# Patient Record
Sex: MALE | Race: WHITE | NOT HISPANIC OR LATINO | URBAN - METROPOLITAN AREA
[De-identification: names, ages, dates, MRNs, and addresses within clinical notes are randomized per-mention and may not be internally consistent; named-entity substitution may affect disease eponyms.]

---

## 2021-03-02 ENCOUNTER — EMERGENCY (EMERGENCY)
Facility: HOSPITAL | Age: 21
LOS: 1 days | Discharge: HOME | End: 2021-03-02
Attending: EMERGENCY MEDICINE | Admitting: EMERGENCY MEDICINE
Payer: COMMERCIAL

## 2021-03-02 VITALS
RESPIRATION RATE: 16 BRPM | WEIGHT: 209 LBS | DIASTOLIC BLOOD PRESSURE: 75 MMHG | HEIGHT: 74 IN | TEMPERATURE: 99 F | SYSTOLIC BLOOD PRESSURE: 150 MMHG | OXYGEN SATURATION: 98 % | HEART RATE: 67 BPM

## 2021-03-02 VITALS
OXYGEN SATURATION: 100 % | SYSTOLIC BLOOD PRESSURE: 131 MMHG | RESPIRATION RATE: 20 BRPM | HEART RATE: 73 BPM | DIASTOLIC BLOOD PRESSURE: 73 MMHG

## 2021-03-02 DIAGNOSIS — M79.652 PAIN IN LEFT THIGH: ICD-10-CM

## 2021-03-02 PROCEDURE — 93971 EXTREMITY STUDY: CPT | Mod: 26,LT

## 2021-03-02 PROCEDURE — 99284 EMERGENCY DEPT VISIT MOD MDM: CPT

## 2021-03-02 RX ORDER — IBUPROFEN 200 MG
600 TABLET ORAL ONCE
Refills: 0 | Status: COMPLETED | OUTPATIENT
Start: 2021-03-02 | End: 2021-03-02

## 2021-03-02 RX ADMIN — Medication 600 MILLIGRAM(S): at 21:10

## 2021-03-02 NOTE — ED PROVIDER NOTE - NSFOLLOWUPINSTRUCTIONS_ED_ALL_ED_FT
Follow up with your primary care doctor  Alternate tylenol/motrin if you do not have any allergies/intolerance   You can take 600mg of motrin every 6 hours with food as needed  You can take 1000mg of tylenol every 6 hours as needed   Return immediately for any new or worsening symptoms or any new concerns

## 2021-03-02 NOTE — ED PROVIDER NOTE - CLINICAL SUMMARY MEDICAL DECISION MAKING FREE TEXT BOX
left anterior thigh pain rad to l upper calf w/o trauma, hx of venous malformation, no acute arterial ischemia on exam, will check US r/o DVT

## 2021-03-02 NOTE — ED ADULT TRIAGE NOTE - CHIEF COMPLAINT QUOTE
Pt w/ PMH of venous malformation to left thigh presents c/o pain to left calf x24 hours.  Pt denies trauma or injury.  Pt does not take AC.  Pt denies redness, swelling or cough/SOB.

## 2021-03-02 NOTE — ED PROVIDER NOTE - PHYSICAL EXAMINATION
Physical Exam  GEN: Awake, alert, non-toxic appearing, NCAT  EYES: full EOMI,  ENT: External inspection normal, normal voice,   HEAD: atraumatic  NECK: FROM neck, supple,   RESP: no tachypnea, no hypoxia, no resp distress,  MSK: no calf/thigh swelling, +tenderness to L anterior lower thigh,   SKIN: cap refill < 2 sec, pedal pulse palpable, no peripheral cyanosis, no erythema

## 2021-03-02 NOTE — ED ADULT TRIAGE NOTE - INTERNATIONAL TRAVEL
Telephone Encounter by Clarita Freeman MD at 05/21/18 10:04 AM     Author:  Clarita Freeman MD Service:  (none) Author Type:  Physician     Filed:  05/21/18 10:04 AM Encounter Date:  5/21/2018 Status:  Signed     :  Clarita Freeman MD (Physician)            Please send my note to PCP.[BM1.1M]      Revision History        User Key Date/Time User Provider Type Action    > BM1.1 05/21/18 10:04 AM Clarita Freeman MD Physician Sign    M - Manual            
Telephone Encounter by Diana So RN at 05/22/18 02:13 PM     Author:  Diana oS RN Service:  (none) Author Type:  Registered Nurse     Filed:  05/22/18 02:15 PM Encounter Date:  5/21/2018 Status:  Signed     :  Diana So RN (Registered Nurse)            5- office notes faxed to Dr Felipe Hoover [phone] as requested.[BN1.1M]      Revision History        User Key Date/Time User Provider Type Action    > BN1.1 05/22/18 02:15 PM Diana So RN Registered Nurse Sign    M - Manual            
No

## 2021-03-02 NOTE — ED ADULT NURSE NOTE - OBJECTIVE STATEMENT
Pt states "I have VM in my left leg and last night when I was trying to step on my scale I had this sharp cramping pain in calf and my doctor said I should come in to make sure its not a clot".

## 2021-03-02 NOTE — ED PROVIDER NOTE - PATIENT PORTAL LINK FT
You can access the FollowMyHealth Patient Portal offered by [facility] by registering at the following website: [email or web address]. By joining [employer]’s FollowMyHealth portal, you will also be able to view your health information using other applications (apps) compatible with our system.

## 2021-03-04 ENCOUNTER — APPOINTMENT (OUTPATIENT)
Dept: HEART AND VASCULAR | Facility: CLINIC | Age: 21
End: 2021-03-04
Payer: COMMERCIAL

## 2021-03-04 DIAGNOSIS — Z78.9 OTHER SPECIFIED HEALTH STATUS: ICD-10-CM

## 2021-03-04 PROBLEM — Z00.00 ENCOUNTER FOR PREVENTIVE HEALTH EXAMINATION: Status: ACTIVE | Noted: 2021-03-04

## 2021-03-04 PROCEDURE — 99204 OFFICE O/P NEW MOD 45 MIN: CPT

## 2021-03-04 PROCEDURE — 99072 ADDL SUPL MATRL&STAF TM PHE: CPT

## 2021-03-04 RX ORDER — CHROMIUM 200 MCG
1000 TABLET ORAL
Refills: 0 | Status: ACTIVE | COMMUNITY

## 2021-03-04 NOTE — PHYSICAL EXAM
[Alert] : alert [PERRL] : pupils equal, round and reactive to light [Normal Hearing] : hearing was normal [Supple] : the neck was supple [No Respiratory Distress] : no respiratory distress [Pedal Pulses Normal] : the pedal pulses are present [No Edema] : there was no peripheral edema [Not Tender] : non-tender [No CVA Tenderness] : no ~M costovertebral angle tenderness [Oriented x3] : oriented to person, place, and time
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
## 2021-03-12 PROBLEM — Q27.9 CONGENITAL MALFORMATION OF PERIPHERAL VASCULAR SYSTEM, UNSPECIFIED: Chronic | Status: ACTIVE | Noted: 2021-03-02

## 2021-03-17 ENCOUNTER — RESULT REVIEW (OUTPATIENT)
Age: 21
End: 2021-03-17

## 2021-03-17 ENCOUNTER — APPOINTMENT (OUTPATIENT)
Dept: MRI IMAGING | Facility: CLINIC | Age: 21
End: 2021-03-17
Payer: COMMERCIAL

## 2021-03-17 ENCOUNTER — OUTPATIENT (OUTPATIENT)
Dept: OUTPATIENT SERVICES | Facility: HOSPITAL | Age: 21
LOS: 1 days | End: 2021-03-17

## 2021-03-17 PROCEDURE — 73720 MRI LWR EXTREMITY W/O&W/DYE: CPT | Mod: 26,LT

## 2021-03-23 ENCOUNTER — APPOINTMENT (OUTPATIENT)
Dept: MRI IMAGING | Facility: CLINIC | Age: 21
End: 2021-03-23

## 2021-10-04 ENCOUNTER — APPOINTMENT (OUTPATIENT)
Dept: HEART AND VASCULAR | Facility: CLINIC | Age: 21
End: 2021-10-04
Payer: COMMERCIAL

## 2021-10-04 DIAGNOSIS — M79.89 OTHER SPECIFIED SOFT TISSUE DISORDERS: ICD-10-CM

## 2021-10-04 DIAGNOSIS — Z78.9 OTHER SPECIFIED HEALTH STATUS: ICD-10-CM

## 2021-10-04 DIAGNOSIS — Q27.9 CONGENITAL MALFORMATION OF PERIPHERAL VASCULAR SYSTEM, UNSPECIFIED: ICD-10-CM

## 2021-10-04 DIAGNOSIS — M25.662 STIFFNESS OF LEFT KNEE, NOT ELSEWHERE CLASSIFIED: ICD-10-CM

## 2021-10-04 PROCEDURE — 99214 OFFICE O/P EST MOD 30 MIN: CPT

## 2021-10-08 PROBLEM — Q27.9 VENOUS MALFORMATION: Status: ACTIVE | Noted: 2021-03-04

## 2021-10-08 PROBLEM — Z78.9 NO PERTINENT PAST MEDICAL HISTORY: Status: RESOLVED | Noted: 2021-10-08 | Resolved: 2021-10-08

## 2021-10-08 PROBLEM — M25.662 DECREASED ROM OF LEFT KNEE: Status: ACTIVE | Noted: 2021-10-08

## 2021-10-08 PROBLEM — M79.89 SWELLING OF THIGH: Status: ACTIVE | Noted: 2021-10-08

## 2021-10-08 NOTE — PHYSICAL EXAM
[Alert] : alert [No Acute Distress] : no acute distress [PERRL] : pupils equal, round and reactive to light [Normal Hearing] : hearing was normal [No Neck Mass] : no neck mass was observed [No Respiratory Distress] : no respiratory distress [Normal Rate] : heart rate was normal  [Regular Rhythm] : with a regular rhythm [Femoral Arteries Normal] : femoral pulses were normal without bruits [Normal Bowel Sounds] : normal bowel sounds [Not Tender] : non-tender [Not Distended] : not distended [Normal Gait] : normal gait [Normal Strength/Tone] : muscle strength and tone were normal [No Rash] : no rash [No Skin Lesions] : no skin lesions [No Motor Deficits] : the motor exam was normal [No Sensory Deficits] : the sensory exam was normal to light touch and pinprick [Oriented x3] : oriented to person, place, and time [de-identified] : left thigh slightly larger than right thigh

## 2021-10-08 NOTE — ASSESSMENT
[FreeTextEntry1] : This is a 20-year-old male otherwise in good health who has a long history of an intramuscular venous malformation in the distal left thigh which was treated multiple times with ethanol by Dr. Aguilar. In general he has done fairly well aside from one episode of ulceration which healed. Over the last several years he has had issues with pain around the knee and inability to completely flex the knee joint. It does not seriously interfere with his lifestyle or activity but it is bothersome. He also had a recent episode of pain in the upper left calf and we got a new MRI to evaluate the calf for any other venous malformation but none was present. It did show some intramuscular edema consistent with a muscle strain or neurogenic edema. That pain has since resolved. The MRI of the treated thigh venous malformation shows a small amount of residual vascularity and a defect in the muscle tissues. We had a discussion about the nature of his current symptoms and I told them it was most likely secondary to the embolization, as there does not appear to be that much in the way of residual malformation. I suggested that we try a course of physical therapy to see if we can improve his flexibility and reduce the discomfort.

## 2021-11-14 ENCOUNTER — EMERGENCY (EMERGENCY)
Facility: HOSPITAL | Age: 21
LOS: 1 days | Discharge: HOME | End: 2021-11-14
Attending: EMERGENCY MEDICINE | Admitting: EMERGENCY MEDICINE
Payer: COMMERCIAL

## 2021-11-14 VITALS
HEIGHT: 74 IN | OXYGEN SATURATION: 97 % | RESPIRATION RATE: 18 BRPM | WEIGHT: 225.09 LBS | DIASTOLIC BLOOD PRESSURE: 81 MMHG | SYSTOLIC BLOOD PRESSURE: 146 MMHG | TEMPERATURE: 98 F | HEART RATE: 80 BPM

## 2021-11-14 DIAGNOSIS — M25.562 PAIN IN LEFT KNEE: ICD-10-CM
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
## 2021-11-14 PROCEDURE — 99284 EMERGENCY DEPT VISIT MOD MDM: CPT | Mod: 25

## 2021-11-14 PROCEDURE — 73562 X-RAY EXAM OF KNEE 3: CPT | Mod: 26,LT

## 2021-11-14 NOTE — ED PROVIDER NOTE - CARE PROVIDER_API CALL
Derek Ruffin)  Orthopaedic Surgery  [address] [place] [address]  Phone: [phone]  Fax: [phone]  Follow Up Time:     Quentin Bingham)  [facility]  Orthopedics  [address] [place] [address]  Phone: [phone]  Fax: [phone]  Follow Up Time:

## 2021-11-14 NOTE — ED PROVIDER NOTE - PATIENT PORTAL LINK FT
You can access the FollowMyHealth Patient Portal offered by [facility] by registering at the following website: [email or web address]. By joining [employer]’s FollowMyHealth portal, you will also be able to view your health information using other applications (apps) compatible with our system.

## 2021-11-14 NOTE — ED ADULT TRIAGE NOTE - CHIEF COMPLAINT QUOTE
Pt walks in c/o L knee pain after a mechanical slip and fall this morning. Pt unable to bear full weight on L knee. PMH of L thigh AVM.

## 2021-11-14 NOTE — ED PROVIDER NOTE - NSFOLLOWUPINSTRUCTIONS_ED_ALL_ED_FT
Please follow up with the orthopedist this week.  Use the crutches and knee immobilizer when walking and please avoid bearing weight on the knee.  Ibuprofen or Tylenol as needed for pain.

## 2021-11-14 NOTE — ED BEHAVIORAL HEALTH NOTE - BEHAVIORAL HEALTH NOTE
SW was consulted to the ED to discuss follow up care with Orthopedic Provider.  SW offered PT a list of first available appt with Orthopedic Providers but could not decide which appt to schedule today.  SW informed PT will receive a follow up call tomorrow from a CM and gave PT contact information when PT is ready to schedule a follow up appt.  Team made aware and LIZZIE made available for further assistance.

## 2021-11-14 NOTE — ED ADULT NURSE NOTE - NSIMPLEMENTINTERV_GEN_ALL_ED
Implemented All Fall Risk Interventions:  [place] to call system. Call bell, personal items and telephone within reach. Instruct patient to call for assistance. Room bathroom lighting operational. Non-slip footwear when patient is off stretcher. Physically safe environment: no spills, clutter or unnecessary equipment. Stretcher in lowest position, wheels locked, appropriate side rails in place. Provide visual cue, wrist band, yellow gown, etc. Monitor gait and stability. Monitor for mental status changes and reorient to person, place, and time. Review medications for side effects contributing to fall risk. Reinforce activity limits and safety measures with patient and family.

## 2021-11-14 NOTE — ED ADULT NURSE NOTE - OBJECTIVE STATEMENT
Pt reports slip and fall, landing on left knee, endorses hx of 10-11 LLE surgeries. Admits to pain w/ activity, denies pain at rest. No obvious swelling or deformities noted. Positive distal pulses, pt denies numbness or tingling.

## 2021-11-14 NOTE — ED PROVIDER NOTE - CLINICAL SUMMARY MEDICAL DECISION MAKING FREE TEXT BOX
20 y/o M with PMHx of L side venous malformation presents to ED complaining of knee pain secondary to a mechanical fall. Pt is in no acute distress but has difficulty bearing weight. Will obtain X-ray and have pt follow up with orthopedics.

## 2021-11-14 NOTE — ED PROVIDER NOTE - OBJECTIVE STATEMENT
20 y/o M with PMHx of L side venous malformation presents to ED complaining of knee pain secondary to a mechanical slip on a plastic lid last night. Pt is unable to fully wear weight.

## 2021-11-14 NOTE — ED PROVIDER NOTE - CARE PROVIDERS DIRECT ADDRESSES
[email or web address]

## 2022-07-18 ENCOUNTER — APPOINTMENT (OUTPATIENT)
Dept: HEART AND VASCULAR | Facility: CLINIC | Age: 22
End: 2022-07-18

## 2022-08-15 ENCOUNTER — APPOINTMENT (OUTPATIENT)
Dept: HEART AND VASCULAR | Facility: CLINIC | Age: 22
End: 2022-08-15

## 2022-08-15 ENCOUNTER — NON-APPOINTMENT (OUTPATIENT)
Age: 22
End: 2022-08-15

## 2022-08-15 PROCEDURE — 99213 OFFICE O/P EST LOW 20 MIN: CPT

## 2022-08-17 NOTE — HISTORY OF PRESENT ILLNESS
[FreeTextEntry1] : Mr. 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 He has undergone several procedures with Dr. Aguilar in [place] and notes that he is scheduled to see him next week as he is going back to [place]. He reports increasing pain in his L knee that started around 2-3 weeks ago and notes it is restricting his range of motion.

## 2022-08-17 NOTE — PHYSICAL EXAM
[Alert] : alert [No Acute Distress] : no acute distress [No Neck Mass] : no neck mass was observed [No Respiratory Distress] : no respiratory distress [Normal Bowel Sounds] : normal bowel sounds [Not Tender] : non-tender [Normal Gait] : normal gait [de-identified] : Normal ROM of L knee

## 2022-08-17 NOTE — ASSESSMENT
[FreeTextEntry1] : This is a 20-year-old male that is status post multiple direct embolizations of a distal left thigh intramuscular venous malformation. These procedures were performed by Dr Aguilar in [place], where Shantelle is from originally. He now lives in [place]. I saw him in consultation last year for residual symptoms of intermittent pain and stiffness in the thigh sometimes preventing him from the straightening his leg completely. The MRI scans last year showed a defect in the muscle presumably related to the prior ethanol embolization treatments with some T2 bright areas which may represent residual malformation. I recommended physical therapy last year and he did that with some improvement but he said over the last few months he is beginning to get worse again, especially when he is sitting for any prolonged period of time. He scheduled to have a scan next week in [place] and then see Dr. Aguilar. I suggested that we wait for the scan results which they will send me, as well as Dr Aguilar opinion regarding further treatment.

## 2022-09-18 ENCOUNTER — EMERGENCY (EMERGENCY)
Facility: HOSPITAL | Age: 22
LOS: 1 days | Discharge: HOME | End: 2022-09-18
Admitting: EMERGENCY MEDICINE

## 2022-09-18 VITALS
DIASTOLIC BLOOD PRESSURE: 81 MMHG | WEIGHT: 259.93 LBS | OXYGEN SATURATION: 98 % | HEIGHT: 74 IN | RESPIRATION RATE: 19 BRPM | TEMPERATURE: 98 F | HEART RATE: 58 BPM | SYSTOLIC BLOOD PRESSURE: 133 MMHG

## 2022-09-18 PROCEDURE — 99283 EMERGENCY DEPT VISIT LOW MDM: CPT

## 2022-09-18 NOTE — ED ADULT NURSE NOTE - CHIEF COMPLAINT QUOTE
Pt presents to ed reporting laceration to right middle finger s/p cut with knife. bleeding controlled with pressure. unknown last tdap

## 2022-09-19 NOTE — ED PROVIDER NOTE - OBJECTIVE STATEMENT
Patient presents with laceration to the R3rd finger today. denies paresthesias or focal weakness. last tetanus <5 years ago.

## 2022-09-19 NOTE — ED PROVIDER NOTE - CLINICAL SUMMARY MEDICAL DECISION MAKING FREE TEXT BOX
Patient present with laceration to the R 3rd finger this evening. tetanus up to date. wound sutured without complication. advised return in 10 days for suture removal. all precautions reviewed

## 2022-09-19 NOTE — ED PROVIDER NOTE - NSFOLLOWUPINSTRUCTIONS_ED_ALL_ED_FT
apply bacitracin to wound twice daily.  return in 10 days for suture removal.   return immediately if fever, chills, increased pain, redness, swelling.

## 2022-09-19 NOTE — ED PROVIDER NOTE - PATIENT PORTAL LINK FT
You can access the FollowMyHealth Patient Portal offered by [facility] by registering at the following website: [email or web address]. By joining [employer]’s FollowMyHealth portal, you will also be able to view your health information using other applications (apps) compatible with our system.

## 2022-09-21 DIAGNOSIS — S61.212A LACERATION WITHOUT FOREIGN BODY OF RIGHT MIDDLE FINGER WITHOUT DAMAGE TO NAIL, INITIAL ENCOUNTER: ICD-10-CM
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
## 2023-04-21 NOTE — ED PROVIDER NOTE - NS_EDPROVIDERDISPOUSERTYPE_ED_A_ED
I have personally evaluated and examined the patient. The Attending was available to me as a supervising provider if needed. CGI<=3  symptom stabilization  outpatient follow up  shelter

## 2024-01-30 ENCOUNTER — APPOINTMENT (OUTPATIENT)
Dept: HEPATOLOGY | Facility: CLINIC | Age: 24
End: 2024-01-30
Payer: COMMERCIAL

## 2024-01-30 VITALS
WEIGHT: 275 LBS | HEIGHT: 73 IN | DIASTOLIC BLOOD PRESSURE: 78 MMHG | TEMPERATURE: 98.5 F | RESPIRATION RATE: 16 BRPM | BODY MASS INDEX: 36.45 KG/M2 | OXYGEN SATURATION: 97 % | HEART RATE: 84 BPM | SYSTOLIC BLOOD PRESSURE: 134 MMHG

## 2024-01-30 PROCEDURE — 76981 USE PARENCHYMA: CPT

## 2024-01-30 PROCEDURE — 99204 OFFICE O/P NEW MOD 45 MIN: CPT

## 2024-02-02 NOTE — PHYSICAL EXAM
[Scleral Icterus] : No Scleral Icterus [Abdominal  Ascites] : no ascites [Asterixis] : no asterixis observed [Jaundice] : No jaundice [Depression] : no depression [Hallucinations] : ~T no ~M hallucinations [General Appearance - Alert] : alert [General Appearance - In No Acute Distress] : in no acute distress [General Appearance - Well Nourished] : well nourished [General Appearance - Well Developed] : well developed [General Appearance - Well-Appearing] : healthy appearing [Sclera] : the sclera and conjunctiva were normal [Extraocular Movements] : extraocular movements were intact [] : no respiratory distress [Exaggerated Use Of Accessory Muscles For Inspiration] : no accessory muscle use [Auscultation Breath Sounds / Voice Sounds] : lungs were clear to auscultation bilaterally [Heart Rate And Rhythm] : heart rate was normal and rhythm regular [Edema] : there was no peripheral edema [Bowel Sounds] : normal bowel sounds [Abdomen Soft] : soft [Abdomen Tenderness] : non-tender [Abdomen Mass (___ Cm)] : no abdominal mass palpated [Skin Color & Pigmentation] : normal skin color and pigmentation [No Focal Deficits] : no focal deficits [Oriented To Time, Place, And Person] : oriented to person, place, and time [Impaired Insight] : insight and judgment were intact [Affect] : the affect was normal

## 2024-02-02 NOTE — HISTORY OF PRESENT ILLNESS
[FreeTextEntry1] : Pt is a 23 year old male with PMH class II obesity (BMI 36), vascular malformation of L knee s/p multiple direct embolizations of a distal L thigh intramuscular venous malformation who presents for initial visit for evaluation/management of elevated LFTs. PCP: Jules Rayo GI: None   The pt brought the following records in for review at today's visit:  No prior LFTs, abd imaging in EMR.  -7/2019: WBC 11.41, Hgb 15.8, Plt 300. Na 140, K 4.7, Cr 1.14. AST/ALT 46/123, TB 0.7,  (wnl; nl range ), Alb 4.1, TP 7.8. INR 1.0.  -12/22/23: WBC 7.6, Hgb 15.2, plt 318, Cr 0.92. AST/ALT 26/53, TB 0.9, , Alb 5, TP 7.4. GGT 27. TSH 5.11. VIt D 24, Cholesterol 181, HDL 46, Triglyceride 76, , A1c 5   Liver Hx: Diagnosed at age: [age] pt had routine labs which showed elevated labs. ~2021 labs LFTs WNL. August 2023 while in [place], LFTs elevated again on a routine lab. Sonogram was order but pt never got it done because he moved to [facility]. While in [place], pt saw PCP 12/2023 and labs done which showed elevated LFTs. Pt reports his weight fluctuates and lowest was 210lb at summer 2020. During January 2020, he started to diet/exercise and stopped drinking alcohol and he lost 30lbs. Pt got labs done after that and his LFTs were WNL at that time.   Pt reports due to poor diet and lack of exercise, he gained 65lbs since summer 2020. Today pt reports feeling well. Denies fever, chills, n/v/d/c, SOB, CP, cough, abd pain, blood in stool or black stool. Good appetite. Reports doing lots of strength training recently.  Prior liver biopsy: Never Prior HE: never Prior GI bleed: never   Family Hx: Maternal grandmother with thyroid issue. Mother with Hashimoto. Denies hepatitis, fatty liver, cirrhosis, liver cancer, crohn's disease, colon cancer, Lupus. Grandmother's sibling with leukemia and one with brain cancer.   Social Hx Tobacco: never Alcohol: ~6 drinks (wine or cocktail) a week for 8 months. He would not drink for a week and another week with 10 drinks. Usually drinks with friends and gf. Did not drink during covid19. Drugs: not now. Used to smoke Marijuana heavily while in [place].  Worked at an  company previously. Graduated from [employer]. Now unemployed. Lives with alone   PSH: left thigh venous malformation: 12-13 pure alcohol embolization since he was 10 years.  Last EGD: never Last COL: never  Allergies: NKDA   CURRENT MEDS: Shanice D supplement.  No herbal supplements.

## 2024-02-02 NOTE — ASSESSMENT
[FreeTextEntry1] : Pt is a 23 year old male with PMH class II obesity (BMI 36), vascular malformation of L knee s/p multiple direct embolizations of a distal L thigh intramuscular venous malformation who presents for initial visit for evaluation/management of elevated LFTs.  #Elevated LFTs:  Likely 2/2 MASLD in setting of obesity (with rapid increases/decreases in weight over time). However, given pt's young age, will also check serologies for other etiologies of chronic liver disease. A1c wnl in 12/2023; lipid panel 12/2023 with elevated LDL (118). - Check chronic liver disease serologies, hepatitis A/B/C serologies. - Check Abd US/doppler. - Fibroscan was performed today:  (S3), 6.5 kPa (F0-1). Results were discussed with the pt. - Counseled pt on natural history of MASLD - Counseled pt on dietary/exercise lifestyle modifications for weight loss. Discussed Mediterranean diet. Goal weight loss 7-10% of current weight. - Fibroscan annually (next in 1/2025).  RTC in 6 weeks.

## 2024-02-02 NOTE — REVIEW OF SYSTEMS
[Fever] : no fever [Chills] : no chills [Feeling Poorly] : not feeling poorly [Feeling Tired] : not feeling tired [Sore Throat] : no sore throat [Hoarseness] : no hoarseness [Chest Pain] : no chest pain [Palpitations] : no palpitations [Leg Claudication] : no intermittent leg claudication [Lower Ext Edema] : no extremity edema [Shortness Of Breath] : no shortness of breath [Wheezing] : no wheezing [Cough] : no cough [SOB on Exertion] : no shortness of breath during exertion [Abdominal Pain] : no abdominal pain [Vomiting] : no vomiting [Constipation] : no constipation [Diarrhea] : no diarrhea [Melena] : no melena

## 2024-02-21 ENCOUNTER — OUTPATIENT (OUTPATIENT)
Dept: OUTPATIENT SERVICES | Facility: HOSPITAL | Age: 24
LOS: 1 days | End: 2024-02-21

## 2024-02-21 ENCOUNTER — APPOINTMENT (OUTPATIENT)
Dept: ULTRASOUND IMAGING | Facility: CLINIC | Age: 24
End: 2024-02-21
Payer: COMMERCIAL

## 2024-02-21 ENCOUNTER — RESULT REVIEW (OUTPATIENT)
Age: 24
End: 2024-02-21

## 2024-02-21 PROCEDURE — 93975 VASCULAR STUDY: CPT | Mod: 26

## 2024-02-21 PROCEDURE — 76700 US EXAM ABDOM COMPLETE: CPT | Mod: 26,59

## 2024-02-22 ENCOUNTER — NON-APPOINTMENT (OUTPATIENT)
Age: 24
End: 2024-02-22

## 2024-02-28 ENCOUNTER — LABORATORY RESULT (OUTPATIENT)
Age: 24
End: 2024-02-28

## 2024-03-12 ENCOUNTER — APPOINTMENT (OUTPATIENT)
Dept: HEPATOLOGY | Facility: CLINIC | Age: 24
End: 2024-03-12
Payer: COMMERCIAL

## 2024-03-12 VITALS
HEART RATE: 78 BPM | OXYGEN SATURATION: 96 % | HEIGHT: 73 IN | BODY MASS INDEX: 35.12 KG/M2 | DIASTOLIC BLOOD PRESSURE: 79 MMHG | RESPIRATION RATE: 16 BRPM | WEIGHT: 265 LBS | TEMPERATURE: 97.3 F | SYSTOLIC BLOOD PRESSURE: 138 MMHG

## 2024-03-12 DIAGNOSIS — K76.0 FATTY (CHANGE OF) LIVER, NOT ELSEWHERE CLASSIFIED: ICD-10-CM

## 2024-03-12 DIAGNOSIS — R74.8 ABNORMAL LEVELS OF OTHER SERUM ENZYMES: ICD-10-CM

## 2024-03-12 PROCEDURE — 99214 OFFICE O/P EST MOD 30 MIN: CPT

## 2024-03-12 NOTE — ASSESSMENT
[FreeTextEntry1] : Pt is a 23 year old male with PMH class II obesity (BMI 36), vascular malformation of L knee s/p multiple direct embolizations of a distal L thigh intramuscular venous malformation who presents for follow-up visit for evaluation/management of elevated LFTs.  -12/22/23: WBC 7.6, Hgb 15.2, plt 318, Cr 0.92. AST/ALT 26/53, TB 0.9, , Alb 5, TP 7.4. GGT 27. TSH 5.11. VIt D 24, Cholesterol 181, HDL 46, Triglyceride 76, , A1c 5.0%. -1/30/24: Seen for initial visit. [Weight 275#, BMI 36.28] Fibroscan =>  (S3), 6.5 kPa (F1), IQR 19%. -2/2024: Abd US/doppler => Normal liver size. Diffusely increased parenchymal echogenicity c/w hepatic steatosis. Smooth liver contour. No focal liver lesions. No biliary ductal dilatation. GB wnl. Spleen 13.1cm borderline enlarged. No ascites. Patent hepatic and portal veins. -2/28/24: WBC 7.33, Hgb 15.2, Plt 279. Na 137, K 5.2, Cr 1.14. Ca 10.6. AST/ALT 23/42, TB 0.4, , Alb 5.1, TP 7.4. INR 1.03. Total cholesterol 162, HDL 40 (low),  (high), TG 58. NOÉ neg, ASMA neg, AMA neg, IgG quant wnl, IgM quant wnl. Serum Fe 63, Iron sat 18%, ferritin 164. A1AT wnl. Ceruloplasmin wnl. TTG IgA neg (IgA quant wnl). HAV IgM neg/IgG positive (HAV immune). HBcAb total neg, HBsAg neg, HBsAb < 3.0 (HBV nonimmune). HCV Ab neg.  Since last visit, pt has been doing well, has lost 10# via dietary/exercise lifestyle modifications.  #Elevated LFTs:  Likely 2/2 MASLD in setting of obesity (with rapid increases/decreases in weight over time). Serologic workup for etiologies of chronic liver disease was neg. A1c wnl in 12/2023; lipid panel 12/2023 with elevated LDL (118). - Fibroscan (2/2024):  (S3), 6.5 kPa (F1), IQR 19%.  - Fibroscan annually (next in 1/2025). - Counseled pt on natural history of MASLD - Counseled pt on dietary/exercise lifestyle modifications for weight loss. Discussed Mediterranean diet. Goal weight loss 7-10% of current weight. - HAV immune. Previously vaccinated for HBV but HBsAb titer low; advised pt to get HBV booster with PCP. HCV Ab neg. - Counseled pt to avoid hepatotoxins, alcohol use, herbal supplements. Limit acetaminophen to 2g/day.  RTC in 6 months.

## 2024-03-12 NOTE — REVIEW OF SYSTEMS
[Fever] : no fever [Chills] : no chills [Feeling Tired] : not feeling tired [Feeling Poorly] : not feeling poorly [Sore Throat] : no sore throat [Chest Pain] : no chest pain [Hoarseness] : no hoarseness [Palpitations] : no palpitations [Leg Claudication] : no intermittent leg claudication [Lower Ext Edema] : no extremity edema [Shortness Of Breath] : no shortness of breath [Wheezing] : no wheezing [Cough] : no cough [Abdominal Pain] : no abdominal pain [SOB on Exertion] : no shortness of breath during exertion [Constipation] : no constipation [Vomiting] : no vomiting [Diarrhea] : no diarrhea [Melena] : no melena

## 2024-03-12 NOTE — HISTORY OF PRESENT ILLNESS
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 Last seen on 1/30/24 for initial visit. PCP: Dr. James Lou [phone] GI: None   Interval Hx 3/12/24: Now engages in significant resistance training multiple times a week of duration ~1hr leading to 10lb weight loss. Denies nausea, vomiting, abdominal pain, dysuria, black or bloody stools. Takes occasional protein powder (whey isolate) as part of strength training (1h/day, 4d/week). Eating more fruit/veg. He has cut out all alcohol. Takes Vit D weekly as instructed by his PMD for low Vit D level.  The pt brought the following records in for review at today's visit:  No prior LFTs, abd imaging in EMR. -7/2019: WBC 11.41, Hgb 15.8, Plt 300. Na 140, K 4.7, Cr 1.14. AST/ALT 46/123, TB 0.7,  (wnl; nl range ), Alb 4.1, TP 7.8. INR 1.0. -12/22/23: WBC 7.6, Hgb 15.2, plt 318, Cr 0.92. AST/ALT 26/53, TB 0.9, , Alb 5, TP 7.4. GGT 27. TSH 5.11. VIt D 24, Cholesterol 181, HDL 46, Triglyceride 76, , A1c 5.0%. -1/30/24: Seen for initial visit. [Weight 275#, BMI 36.28] Fibroscan =>  (S3), 6.5 kPa (F1), IQR 19%. -2/2024: Abd US/doppler => Normal liver size. Diffusely increased parenchymal echogenicity c/w hepatic steatosis. Smooth liver contour. No focal liver lesions. No biliary ductal dilatation. GB wnl. Spleen 13.1cm borderline enlarged. No ascites. Patent hepatic and portal veins. -2/28/24: WBC 7.33, Hgb 15.2, Plt 279. Na 137, K 5.2, Cr 1.14. Ca 10.6. AST/ALT 23/42, TB 0.4, , Alb 5.1, TP 7.4. INR 1.03. Total cholesterol 162, HDL 40 (low),  (high), TG 58. NOÉ neg, ASMA neg, AMA neg, IgG quant wnl, IgM quant wnl. Serum Fe 63, Iron sat 18%, ferritin 164. A1AT wnl. Ceruloplasmin wnl. TTG IgA neg (IgA quant wnl). HAV IgM neg/IgG positive (HAV immune). HBcAb total neg, HBsAg neg, HBsAb < 3.0 (HBV nonimmune). HCV Ab neg.  Liver Hx: Diagnosed at age: [age] pt had routine labs which showed elevated labs. ~2021 labs LFTs WNL. August 2023 while in [place], LFTs elevated again on a routine lab. Sonogram was order but pt never got it done because he moved to [facility]. While in [facility], pt saw PCP 12/2023 and labs done which showed elevated LFTs. Pt reports his weight fluctuates and lowest was 210lb at summer 2020. During January 2020, he started to diet/exercise and stopped drinking alcohol and he lost 30lbs. Pt got labs done after that and his LFTs were WNL at that time.   Pt reports due to poor diet and lack of exercise, he gained 65lbs since summer 2020. Today pt reports feeling well. Denies fever, chills, n/v/d/c, SOB, CP, cough, abd pain, blood in stool or black stool. Good appetite. Reports doing lots of strength training recently.  Prior liver biopsy: Never Prior HE: never Prior GI bleed: never   Family Hx: Maternal grandmother with thyroid issue. Mother with Hashimoto. Denies hepatitis, fatty liver, cirrhosis, liver cancer, crohn's disease, colon cancer, Lupus. Grandmother's sibling with leukemia and one with brain cancer.   Social Hx Tobacco: never Alcohol: ~6 drinks (wine or cocktail) a week for 8 months. He would not drink for a week and another week with 10 drinks. Usually drinks with friends and gf. Did not drink during covid19. Drugs: not now. Used to smoke Marijuana heavily while in [place].  Worked at an  company previously. Graduated from [employer]. Now unemployed. Lives with alone   PSH: left thigh venous malformation: 12-13 pure alcohol embolization since he was 10 years.  Last EGD: never Last COL: never  Allergies: NKDA   CURRENT MEDS: Shanice D supplement.  No herbal supplements. 
normal...

## 2024-03-12 NOTE — PHYSICAL EXAM
[General Appearance - Alert] : alert [General Appearance - In No Acute Distress] : in no acute distress [General Appearance - Well Nourished] : well nourished [General Appearance - Well Developed] : well developed [General Appearance - Well-Appearing] : healthy appearing [Sclera] : the sclera and conjunctiva were normal [Extraocular Movements] : extraocular movements were intact [] : no respiratory distress [Exaggerated Use Of Accessory Muscles For Inspiration] : no accessory muscle use [Auscultation Breath Sounds / Voice Sounds] : lungs were clear to auscultation bilaterally [Heart Rate And Rhythm] : heart rate was normal and rhythm regular [Edema] : there was no peripheral edema [Bowel Sounds] : normal bowel sounds [Abdomen Soft] : soft [Abdomen Tenderness] : non-tender [Abdomen Mass (___ Cm)] : no abdominal mass palpated [Skin Color & Pigmentation] : normal skin color and pigmentation [No Focal Deficits] : no focal deficits [Oriented To Time, Place, And Person] : oriented to person, place, and time [Affect] : the affect was normal [Impaired Insight] : insight and judgment were intact [Scleral Icterus] : No Scleral Icterus [Abdominal  Ascites] : no ascites [Asterixis] : no asterixis observed [Jaundice] : No jaundice [Depression] : no depression [Hallucinations] : ~T no ~M hallucinations

## 2024-03-13 LAB
A1AT SERPL-MCNC: 140 MG/DL
ALBUMIN SERPL ELPH-MCNC: 5.1 G/DL
ALP BLD-CCNC: 127 U/L
ALT SERPL-CCNC: 42 U/L
ANA SER IF-ACNC: NEGATIVE
ANION GAP SERPL CALC-SCNC: 16 MMOL/L
AST SERPL-CCNC: 23 U/L
BASOPHILS # BLD AUTO: 0.05 K/UL
BASOPHILS NFR BLD AUTO: 0.7 %
BILIRUB SERPL-MCNC: 0.4 MG/DL
BUN SERPL-MCNC: 17 MG/DL
CALCIUM SERPL-MCNC: 10.6 MG/DL
CERULOPLASMIN SERPL-MCNC: 22 MG/DL
CHLORIDE SERPL-SCNC: 102 MMOL/L
CHOLEST SERPL-MCNC: 162 MG/DL
CO2 SERPL-SCNC: 20 MMOL/L
CREAT SERPL-MCNC: 1.14 MG/DL
DEPRECATED KAPPA LC FREE/LAMBDA SER: 0.68 RATIO
EGFR: 93 ML/MIN/1.73M2
EOSINOPHIL # BLD AUTO: 0.13 K/UL
EOSINOPHIL NFR BLD AUTO: 1.8 %
FERRITIN SERPL-MCNC: 164 NG/ML
GLUCOSE SERPL-MCNC: 91 MG/DL
HBV CORE IGG+IGM SER QL: NONREACTIVE
HBV SURFACE AB SERPL IA-ACNC: <3 MIU/ML
HBV SURFACE AG SER QL: NONREACTIVE
HCT VFR BLD CALC: 46.2 %
HCV AB SER QL: NONREACTIVE
HCV S/CO RATIO: 0.1 S/CO
HDLC SERPL-MCNC: 40 MG/DL
HEPATITIS A IGG ANTIBODY: REACTIVE
HGB BLD-MCNC: 15.2 G/DL
IGA SER QL IEP: 301 MG/DL
IGG SER QL IEP: 776 MG/DL
IGM SER QL IEP: 105 MG/DL
IMM GRANULOCYTES NFR BLD AUTO: 0.3 %
INR PPP: 1.03 RATIO
IRON SATN MFR SERPL: 18 %
IRON SERPL-MCNC: 63 UG/DL
KAPPA LC CSF-MCNC: 2 MG/DL
KAPPA LC SERPL-MCNC: 1.35 MG/DL
LDLC SERPL CALC-MCNC: 110 MG/DL
LYMPHOCYTES # BLD AUTO: 1.76 K/UL
LYMPHOCYTES NFR BLD AUTO: 24 %
MAN DIFF?: NORMAL
MCHC RBC-ENTMCNC: 28.7 PG
MCHC RBC-ENTMCNC: 32.9 GM/DL
MCV RBC AUTO: 87.3 FL
MITOCHONDRIA AB SER IF-ACNC: NORMAL
MONOCYTES # BLD AUTO: 0.62 K/UL
MONOCYTES NFR BLD AUTO: 8.5 %
NEUTROPHILS # BLD AUTO: 4.75 K/UL
NEUTROPHILS NFR BLD AUTO: 64.7 %
NONHDLC SERPL-MCNC: 122 MG/DL
PLATELET # BLD AUTO: 279 K/UL
POTASSIUM SERPL-SCNC: 5.2 MMOL/L
PROT SERPL-MCNC: 7.4 G/DL
PT BLD: 11.6 SEC
RBC # BLD: 5.29 M/UL
RBC # FLD: 13.5 %
SMOOTH MUSCLE AB SER QL IF: NORMAL
SODIUM SERPL-SCNC: 137 MMOL/L
TIBC SERPL-MCNC: 346 UG/DL
TRIGL SERPL-MCNC: 58 MG/DL
TTG IGA SER IA-ACNC: 2.4 U/ML
TTG IGA SER-ACNC: NEGATIVE
UIBC SERPL-MCNC: 283 UG/DL
WBC # FLD AUTO: 7.33 K/UL

## 2024-04-09 ENCOUNTER — APPOINTMENT (OUTPATIENT)
Dept: INTERNAL MEDICINE | Facility: CLINIC | Age: 24
End: 2024-04-09

## 2024-09-09 ENCOUNTER — APPOINTMENT (OUTPATIENT)
Dept: HEPATOLOGY | Facility: CLINIC | Age: 24
End: 2024-09-09
Payer: COMMERCIAL

## 2024-09-09 VITALS
BODY MASS INDEX: 31.14 KG/M2 | TEMPERATURE: 97 F | SYSTOLIC BLOOD PRESSURE: 130 MMHG | RESPIRATION RATE: 16 BRPM | WEIGHT: 235 LBS | HEIGHT: 73 IN | HEART RATE: 84 BPM | DIASTOLIC BLOOD PRESSURE: 74 MMHG | OXYGEN SATURATION: 96 %

## 2024-09-09 DIAGNOSIS — K76.0 FATTY (CHANGE OF) LIVER, NOT ELSEWHERE CLASSIFIED: ICD-10-CM

## 2024-09-09 PROCEDURE — 99214 OFFICE O/P EST MOD 30 MIN: CPT

## 2024-09-09 NOTE — PHYSICAL EXAM
[General Appearance - Alert] : alert [General Appearance - In No Acute Distress] : in no acute distress [General Appearance - Well Nourished] : well nourished [General Appearance - Well Developed] : well developed [General Appearance - Well-Appearing] : healthy appearing [Sclera] : the sclera and conjunctiva were normal [Extraocular Movements] : extraocular movements were intact [] : no respiratory distress [Exaggerated Use Of Accessory Muscles For Inspiration] : no accessory muscle use [Auscultation Breath Sounds / Voice Sounds] : lungs were clear to auscultation bilaterally [Heart Rate And Rhythm] : heart rate was normal and rhythm regular [Edema] : there was no peripheral edema [Bowel Sounds] : normal bowel sounds [Abdomen Soft] : soft [Abdomen Tenderness] : non-tender [Abdomen Mass (___ Cm)] : no abdominal mass palpated [Skin Color & Pigmentation] : normal skin color and pigmentation [No Focal Deficits] : no focal deficits [Oriented To Time, Place, And Person] : oriented to person, place, and time [Impaired Insight] : insight and judgment were intact [Affect] : the affect was normal [Scleral Icterus] : No Scleral Icterus [Abdominal  Ascites] : no ascites [Asterixis] : no asterixis observed [Jaundice] : No jaundice [Depression] : no depression [Hallucinations] : ~T no ~M hallucinations

## 2024-09-09 NOTE — HISTORY OF PRESENT ILLNESS
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 Last seen in 3/2024. PCP: Dr. James Lou [phone] GI: None  Prior data: -7/2019: WBC 11.41, Hgb 15.8, Plt 300. Na 140, K 4.7, Cr 1.14. AST/ALT 46/123, TB 0.7,  (wnl; nl range ), Alb 4.1, TP 7.8. INR 1.0. -12/22/23: WBC 7.6, Hgb 15.2, plt 318, Cr 0.92. AST/ALT 26/53, TB 0.9, , Alb 5, TP 7.4. GGT 27. TSH 5.11. VIt D 24, Cholesterol 181, HDL 46, Triglyceride 76, , A1c 5.0%. -1/30/24: Seen for initial visit. [Weight 275#, BMI 36.28] Fibroscan =>  (S3), 6.5 kPa (F1), IQR 19%. -2/2024: Abd US/doppler => Normal liver size. Diffusely increased parenchymal echogenicity c/w hepatic steatosis. Smooth liver contour. No focal liver lesions. No biliary ductal dilatation. GB wnl. Spleen 13.1cm borderline enlarged. No ascites. Patent hepatic and portal veins. -2/28/24: WBC 7.33, Hgb 15.2, Plt 279. Na 137, K 5.2, Cr 1.14. Ca 10.6. AST/ALT 23/42, TB 0.4, , Alb 5.1, TP 7.4. INR 1.03. Total cholesterol 162, HDL 40 (low),  (high), TG 58. NOÉ neg, ASMA neg, AMA neg, IgG quant wnl, IgM quant wnl. Serum Fe 63, Iron sat 18%, ferritin 164. A1AT wnl. Ceruloplasmin wnl. TTG IgA neg (IgA quant wnl). HAV IgM neg/IgG positive (HAV immune). HBcAb total neg, HBsAg neg, HBsAb < 3.0 (HBV nonimmune). HCV Ab neg. -3/12/24: Counseled on weight loss. -6/2024: Na 139, K 4.5, Cr 0.99. AST/ALT 16/29, TB 0.7, , Alb 4.7, TP 7.2.  Total cholesterol 135, HDL 49, LDL 77, TG 31. TSH wnl. 25-OH Vit D 53.  Interval Hx 9/9/24: Denies f/c, cp, sob, cough, abd pain, n/v/d/c, hematochezia/melena, dysuria. Having regular BMs. Lost 30# since last visit in 3/2024 via diet/exercise (265# -> 235#).  24h diet recall: Bfast: none Lunch: hummus, short ribs, salad  Dinner: scallops, fish, eggplant Snacks: a few bits of salmon michelle Drinks: 1 Honey Deuce cocktail. 3 small bottles of sparkling water. 4-5 cups of water.  Exercise: strength training 3 days/week (lifting). Plays tennis 3x/week. Walks a lot every day. Has 3 drinks/month. Denies use of herbals/supplements. Has not gotten HBV booster with PCP.   Interval Hx 3/12/24: Now engages in significant resistance training multiple times a week of duration ~1hr leading to 10lb weight loss. Denies nausea, vomiting, abdominal pain, dysuria, black or bloody stools. Takes occasional protein powder (whey isolate) as part of strength training (1h/day, 4d/week). Eating more fruit/veg. He has cut out all alcohol. Takes Vit D weekly as instructed by his PMD for low Vit D level.    Liver Hx: Diagnosed at age: [age] pt had routine labs which showed elevated labs. ~2021 labs LFTs WNL. August 2023 while in [place], LFTs elevated again on a routine lab. Sonogram was order but pt never got it done because he moved to [facility]. While in [facility], pt saw PCP 12/2023 and labs done which showed elevated LFTs. Pt reports his weight fluctuates and lowest was 210lb at summer 2020. During January 2020, he started to diet/exercise and stopped drinking alcohol and he lost 30lbs. Pt got labs done after that and his LFTs were WNL at that time.   Pt reports due to poor diet and lack of exercise, he gained 65lbs since summer 2020. Today pt reports feeling well. Denies fever, chills, n/v/d/c, SOB, CP, cough, abd pain, blood in stool or black stool. Good appetite. Reports doing lots of strength training recently.  Prior liver biopsy: Never Prior HE: never Prior GI bleed: never   Family Hx: Maternal grandmother with thyroid issue. Mother with Hashimoto. Denies hepatitis, fatty liver, cirrhosis, liver cancer, crohn's disease, colon cancer, Lupus. Grandmother's sibling with leukemia and one with brain cancer.   Social Hx Tobacco: never Alcohol: ~6 drinks (wine or cocktail) a week for 8 months. He would not drink for a week and another week with 10 drinks. Usually drinks with friends and gf. Did not drink during covid19. Drugs: not now. Used to smoke Marijuana heavily while in [place].  Worked at an  company previously. Graduated from [employer]. Now unemployed. Lives with alone   PSH: left thigh venous malformation: 12-13 pure alcohol embolization since he was 10 years.  Last EGD: never Last COL: never  Allergies: NKDA   CURRENT MEDS: Shanice D supplement.  No herbal supplements.

## 2024-09-09 NOTE — ASSESSMENT
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 Last seen in 3/2024.  Since last visit, pt has been doing well, has lost 30# via dietary/exercise lifestyle modifications.  #Elevated LFTs:  - Likely 2/2 MASLD in setting of obesity (with rapid increases/decreases in weight over time).  - Serologic workup for etiologies of chronic liver disease was neg.  - A1c wnl in 12/2023; lipid panel 12/2023 with elevated LDL (118). - Fibroscan (1/2024):  (S3), 6.5 kPa (F1), IQR 19%.  - LFTs wnl in 6/2024.  Plan: - Fibroscan annually (next in 1/2025). - Counseled pt on natural history of MASLD - Counseled pt on dietary/exercise lifestyle modifications for weight loss. Discussed Mediterranean diet. Goal weight loss 7-10% of current weight. - HAV immune. Previously vaccinated for HBV but HBsAb titer low; can get HBV booster with PCP. HCV Ab neg. - Counseled pt to avoid hepatotoxins, alcohol use, herbal supplements. Limit acetaminophen to 2g/day.  RTC in 6 months.

## 2024-09-23 ENCOUNTER — APPOINTMENT (OUTPATIENT)
Dept: MRI IMAGING | Facility: CLINIC | Age: 24
End: 2024-09-23

## 2024-10-02 ENCOUNTER — APPOINTMENT (OUTPATIENT)
Dept: MRI IMAGING | Facility: CLINIC | Age: 24
End: 2024-10-02

## 2024-10-02 PROCEDURE — 73718 MRI LOWER EXTREMITY W/O DYE: CPT | Mod: 26,LT

## 2024-10-14 ENCOUNTER — APPOINTMENT (OUTPATIENT)
Dept: HEART AND VASCULAR | Facility: CLINIC | Age: 24
End: 2024-10-14
Payer: COMMERCIAL

## 2024-10-14 DIAGNOSIS — Q27.9 CONGENITAL MALFORMATION OF PERIPHERAL VASCULAR SYSTEM, UNSPECIFIED: ICD-10-CM

## 2024-10-14 PROCEDURE — 99213 OFFICE O/P EST LOW 20 MIN: CPT

## 2024-10-29 ENCOUNTER — APPOINTMENT (OUTPATIENT)
Dept: PEDIATRIC ORTHOPEDIC SURGERY | Facility: CLINIC | Age: 24
End: 2024-10-29
Payer: COMMERCIAL

## 2024-10-29 DIAGNOSIS — M24.562 CONTRACTURE, LEFT KNEE: ICD-10-CM

## 2024-10-29 DIAGNOSIS — M25.662 STIFFNESS OF LEFT KNEE, NOT ELSEWHERE CLASSIFIED: ICD-10-CM

## 2024-10-29 DIAGNOSIS — Q27.9 CONGENITAL MALFORMATION OF PERIPHERAL VASCULAR SYSTEM, UNSPECIFIED: ICD-10-CM

## 2024-10-29 PROCEDURE — 73562 X-RAY EXAM OF KNEE 3: CPT | Mod: RT

## 2024-10-29 PROCEDURE — 99204 OFFICE O/P NEW MOD 45 MIN: CPT | Mod: 25

## 2025-03-03 ENCOUNTER — LABORATORY RESULT (OUTPATIENT)
Age: 25
End: 2025-03-03

## 2025-03-03 ENCOUNTER — APPOINTMENT (OUTPATIENT)
Dept: HEPATOLOGY | Facility: CLINIC | Age: 25
End: 2025-03-03
Payer: COMMERCIAL

## 2025-03-03 VITALS
BODY MASS INDEX: 32.74 KG/M2 | RESPIRATION RATE: 16 BRPM | DIASTOLIC BLOOD PRESSURE: 74 MMHG | HEART RATE: 58 BPM | OXYGEN SATURATION: 97 % | WEIGHT: 247 LBS | SYSTOLIC BLOOD PRESSURE: 137 MMHG | HEIGHT: 73 IN | TEMPERATURE: 97.5 F

## 2025-03-03 DIAGNOSIS — K76.0 FATTY (CHANGE OF) LIVER, NOT ELSEWHERE CLASSIFIED: ICD-10-CM

## 2025-03-03 PROCEDURE — 76981 USE PARENCHYMA: CPT

## 2025-03-03 PROCEDURE — G2211 COMPLEX E/M VISIT ADD ON: CPT | Mod: NC

## 2025-03-03 PROCEDURE — 99214 OFFICE O/P EST MOD 30 MIN: CPT

## 2025-05-15 PROBLEM — E66.811 OBESITY, CLASS I, BMI 30.0-34.9 (SEE ACTUAL BMI): Status: ACTIVE | Noted: 2025-05-15

## 2025-05-27 ENCOUNTER — TRANSCRIPTION ENCOUNTER (OUTPATIENT)
Age: 25
End: 2025-05-27

## 2025-07-01 ENCOUNTER — APPOINTMENT (OUTPATIENT)
Dept: HEPATOLOGY | Facility: CLINIC | Age: 25
End: 2025-07-01
Payer: COMMERCIAL

## 2025-07-01 VITALS
DIASTOLIC BLOOD PRESSURE: 75 MMHG | HEART RATE: 67 BPM | TEMPERATURE: 97.3 F | HEIGHT: 73 IN | SYSTOLIC BLOOD PRESSURE: 129 MMHG | RESPIRATION RATE: 16 BRPM | BODY MASS INDEX: 31.01 KG/M2 | WEIGHT: 234 LBS | OXYGEN SATURATION: 97 %

## 2025-07-01 PROCEDURE — G2211 COMPLEX E/M VISIT ADD ON: CPT | Mod: NC

## 2025-07-01 PROCEDURE — 99214 OFFICE O/P EST MOD 30 MIN: CPT

## 2025-09-11 ENCOUNTER — NON-APPOINTMENT (OUTPATIENT)
Age: 25
End: 2025-09-11